# Patient Record
Sex: MALE | Race: WHITE | Employment: FULL TIME | URBAN - METROPOLITAN AREA
[De-identification: names, ages, dates, MRNs, and addresses within clinical notes are randomized per-mention and may not be internally consistent; named-entity substitution may affect disease eponyms.]

---

## 2017-08-30 ENCOUNTER — OFFICE VISIT (OUTPATIENT)
Dept: FAMILY MEDICINE CLINIC | Facility: CLINIC | Age: 32
End: 2017-08-30

## 2017-08-30 VITALS
SYSTOLIC BLOOD PRESSURE: 126 MMHG | HEART RATE: 82 BPM | HEIGHT: 67 IN | WEIGHT: 216 LBS | RESPIRATION RATE: 16 BRPM | TEMPERATURE: 99 F | DIASTOLIC BLOOD PRESSURE: 80 MMHG | OXYGEN SATURATION: 99 % | BODY MASS INDEX: 33.9 KG/M2

## 2017-08-30 DIAGNOSIS — H93.13 TINNITUS OF BOTH EARS: ICD-10-CM

## 2017-08-30 DIAGNOSIS — Z76.89 ENCOUNTER TO ESTABLISH CARE WITH NEW DOCTOR: ICD-10-CM

## 2017-08-30 DIAGNOSIS — K58.8 OTHER IRRITABLE BOWEL SYNDROME: Primary | ICD-10-CM

## 2017-08-30 DIAGNOSIS — M25.552 CHRONIC LEFT HIP PAIN: ICD-10-CM

## 2017-08-30 DIAGNOSIS — D22.9 CHANGE IN MOLE: ICD-10-CM

## 2017-08-30 DIAGNOSIS — G89.29 CHRONIC LEFT HIP PAIN: ICD-10-CM

## 2017-08-30 PROCEDURE — 99214 OFFICE O/P EST MOD 30 MIN: CPT | Performed by: FAMILY MEDICINE

## 2017-08-30 NOTE — PROGRESS NOTES
Krzysztof Hernandez is a 33year old male. HPI:   New pt to establish care referred by wife who is a patient. Extensive review of health history done with patient today and forms will be scanned to correlate with this documentation.    Electronic chart also for changing mole on face and wants to see Derm  RESPIRATORY: denies shortness of breath with exertion  CARDIOVASCULAR: denies chest pain on exertion  GI: denies abdominal pain and denies heartburn but has IBS and needs to see a GI he states   NEURO: dyanie healthy diet and see GI as he may need colonoscopy    Encounter to establish care with new doctor  Extensive review of health history done with patient today and forms will be scanned to correlate with this documentation.     E-chart reviewed with pt and up

## 2017-08-30 NOTE — PATIENT INSTRUCTIONS
Diet and Lifestyle Tips for Irritable Bowel Syndrome (IBS)    Your healthcare professional may suggest some lifestyle changes to help control your IBS. Changing your diet and managing stress are two of the most important.  Follow your healthcare provider’ The hip joint is formed where the rounded head of the thighbone (femur) joins the pelvic bone. The joint is covered with tissue and powered by large muscles. When all of the parts listed below are healthy, a hip should move easily.      Front view of the ri · Wear tightly woven clothing that covers your skin. Put on a wide-brimmed hat to protect your face, ears, and scalp. · Watch the clock. Try to avoid the sun between 10 a.m. and 4 p.m., when it is strongest.  · Head for the shade or create your own.  Use a · Know that while sunscreen helps protect you, it isn’t enough. Sunscreens extend the length of time you can be outdoors before your skin begins to redden, but they don't give you total protection.  Using sunscreen doesn't mean you can stay out in the sun i

## 2018-01-26 ENCOUNTER — OFFICE VISIT (OUTPATIENT)
Dept: FAMILY MEDICINE CLINIC | Facility: CLINIC | Age: 33
End: 2018-01-26

## 2018-01-26 VITALS
DIASTOLIC BLOOD PRESSURE: 78 MMHG | HEIGHT: 67 IN | HEART RATE: 74 BPM | TEMPERATURE: 99 F | OXYGEN SATURATION: 99 % | SYSTOLIC BLOOD PRESSURE: 122 MMHG | WEIGHT: 219 LBS | RESPIRATION RATE: 16 BRPM | BODY MASS INDEX: 34.37 KG/M2

## 2018-01-26 DIAGNOSIS — Z13.31 DEPRESSION SCREEN: ICD-10-CM

## 2018-01-26 DIAGNOSIS — Z00.8 TESTICULAR EXAM: ICD-10-CM

## 2018-01-26 DIAGNOSIS — R63.5 WEIGHT GAIN: ICD-10-CM

## 2018-01-26 DIAGNOSIS — Z23 NEED FOR DIPHTHERIA-TETANUS-PERTUSSIS (TDAP) VACCINE: ICD-10-CM

## 2018-01-26 DIAGNOSIS — Z71.82 EXERCISE COUNSELING: ICD-10-CM

## 2018-01-26 DIAGNOSIS — Z71.3 DIETARY COUNSELING: ICD-10-CM

## 2018-01-26 DIAGNOSIS — Z00.00 ROUTINE GENERAL MEDICAL EXAMINATION AT A HEALTH CARE FACILITY: Primary | ICD-10-CM

## 2018-01-26 LAB
APPEARANCE: CLEAR
MULTISTIX LOT#: NORMAL NUMERIC
PH, URINE: 6 (ref 4.5–8)
SPECIFIC GRAVITY: 1.02 (ref 1–1.03)
URINE-COLOR: YELLOW
UROBILINOGEN,SEMI-QN: 1 MG/DL (ref 0–1.9)

## 2018-01-26 PROCEDURE — 81003 URINALYSIS AUTO W/O SCOPE: CPT | Performed by: FAMILY MEDICINE

## 2018-01-26 PROCEDURE — 90715 TDAP VACCINE 7 YRS/> IM: CPT | Performed by: FAMILY MEDICINE

## 2018-01-26 PROCEDURE — 90471 IMMUNIZATION ADMIN: CPT | Performed by: FAMILY MEDICINE

## 2018-01-26 PROCEDURE — 99395 PREV VISIT EST AGE 18-39: CPT | Performed by: FAMILY MEDICINE

## 2018-01-26 NOTE — PATIENT INSTRUCTIONS
You were seen today for your annual physical and Tdap booster. Please fast for labs and complete if insurance permits. Please continue healthy habits with your diet and exercise. Wear sunscreen as needed and insect repellant when needed.   See a dentist a Chickenpox (varicella) All men in this age group who have no record of this infection or vaccine 2 doses; the second dose should be given at least 4 weeks after the first dose   Hepatitis A Men at increased risk for infection – talk with your healthcare pr Sexually transmitted infection prevention Men who are sexually active At routine exams   Skin cancer Prevention of skin cancer in fair-skinned adults through age 25 At routine exams   1Those who are 25years of age, who are not up-to-date on their childhoo

## 2018-01-26 NOTE — PROGRESS NOTES
Emelina Brody is a 27year old male who presents for a complete physical exam.   HPI:   Pt seen for his annual CPE. Prevention questions reviewed face to face. Dentist seen and eye doctor due now --glasses and no contacts.   Diet described by pt as [place] found for: PSA       No current outpatient prescriptions on file. Past Medical History:  No date: Hypercholesterolemia      Comment: no meds  No date: Infectious disease      Comment: Hx of MRSA, between toes 2-3 yrs ago, resolved               with tx. history  ALL/ASTHMA: allergies as noted unchanged     EXAM:   /78 (BP Location: Right arm, Patient Position: Sitting, Cuff Size: adult)   Pulse 74   Temp 98.5 °F (36.9 °C) (Oral)   Resp 16   Ht 67\"   Wt 219 lb   SpO2 99%   BMI 34.30 kg/m²   Body mas Future  Pt seen for annual wellness exam today. Healthy diet reviewed and advised increased activity as well. Dentist and eye doctor appts advised regularly. He is to return annually and as needed.     Depression screen  PHQ2 is zero     Testicular exam

## 2018-12-05 ENCOUNTER — TELEPHONE (OUTPATIENT)
Dept: FAMILY MEDICINE CLINIC | Facility: CLINIC | Age: 33
End: 2018-12-05

## 2018-12-05 NOTE — TELEPHONE ENCOUNTER
Called patient and he advised he is going to [facility] with a new PCP that is closer to him as he has moved to [facility].  Submitted PCP removal form for Dr Akshat Granado

## (undated) NOTE — LETTER
02/26/18        [facility] [employer]  [address]  [place] [id number]      Dear Anil Mary,    [facility] records indicate that you have outstanding lab work and or testing that was ordered for you and has not yet been completed:          Brennan Prieto,